# Patient Record
Sex: MALE | Race: WHITE | NOT HISPANIC OR LATINO | Employment: OTHER
[De-identification: names, ages, dates, MRNs, and addresses within clinical notes are randomized per-mention and may not be internally consistent; named-entity substitution may affect disease eponyms.]

---

## 2022-08-05 ENCOUNTER — TRANSCRIBE ORDERS (OUTPATIENT)
Dept: OTHER | Age: 72
End: 2022-08-05

## 2022-08-05 DIAGNOSIS — M25.561 ACUTE PAIN OF RIGHT KNEE: Primary | ICD-10-CM

## 2022-08-16 ENCOUNTER — THERAPY VISIT (OUTPATIENT)
Dept: PHYSICAL THERAPY | Facility: CLINIC | Age: 72
End: 2022-08-16
Attending: FAMILY MEDICINE
Payer: COMMERCIAL

## 2022-08-16 DIAGNOSIS — M25.561 ACUTE PAIN OF RIGHT KNEE: ICD-10-CM

## 2022-08-16 PROCEDURE — 97110 THERAPEUTIC EXERCISES: CPT | Mod: GP | Performed by: PHYSICAL THERAPIST

## 2022-08-16 PROCEDURE — 97161 PT EVAL LOW COMPLEX 20 MIN: CPT | Mod: GP | Performed by: PHYSICAL THERAPIST

## 2022-08-16 NOTE — PROGRESS NOTES
Physical Therapy Initial Evaluation  Subjective:    Patient Health History  Rosendo Joel being seen for R knee pain.     Problem began: 8/4/2022 (MD appt).   Problem occurred: Patient reports his knee pain began 2+ weeks ago after kneeling onto R knee   Pain is reported as 10/10 on pain scale.  General health as reported by patient is good.  Pertinent medical history includes: high blood pressure.   Red flags:  None as reported by patient.  Medical allergies: none.   Surgeries include:  Orthopedic surgery (s/p lumbar surgery, s/p neck fusion(total of 4 surgeries)).    Current medications:  High blood pressure medication and pain medication (for LBP, Tramadol).    Current occupation is Retired.   Primary job tasks include:  Driving.                  Therapist Generated HPI Evaluation         Type of problem:  Right knee.    This is a new condition.    Where condition occurred: at home.  Patient reports pain:  In the joint.  Pain is described as sharp and is intermittent.  Pain radiates to:  Thigh. Pain is the same all the time (activity dependent).  Since onset symptoms are gradually improving.  Associated symptoms:  Buckling/giving out, loss of motion/stiffness and loss of strength. Symptoms are exacerbated by bending/squatting, ascending stairs, descending stairs, transfers, kneeling, walking and standing (sitting with knee bent)  and relieved by heat and ice.  Special tests included:  X-ray (NL).    Work activity restrictions: N/A.  Barriers include:  None as reported by patient.                        Objective:  Standing Alignment:                Ankle/foot deviations: Reduced R gastroc muscle mass.  General Deviations:  Toe out R  Gait:    Gait Type:  Antalgic   Weight Bearing Status:  WBAT   Assistive Devices:  None  Deviations:  Lumbar:  Trunk lean RHip:  Trendelenberg RGeneral Deviations:  Toe out R, tamika decr and stance time decr    Flexibility/Screens:       Lower Extremity:      Decreased right lower  extremity flexibility:  Quadriceps; Hamstrings and Gastroc                                                 Hip Evaluation    Hip Strength:    Flexion:   Left: 5/5   -  Pain:  Right: 4/5   -  Pain:                      Abduction:  Left: 4/5    -   Pain:Right: 3-/5   +   Pain:                           Knee Evaluation:  ROM:  AROM: normal (B/symmetrical)            Strength:     Extension:  Left: 5/5    Pain:-      Right: 4+/5    Pain:+/-  Flexion:  Left: 5/5    Pain:-      Right: 4/5    Pain:-    Quad Set Left:  WNL    Pain: -   Quad Set Right:  Fair    Pain: +/-  Ligament Testing:  Normal                Special Tests:     Right knee positive for the following tests:  Meniscal  Palpation:      Right knee tenderness present at:  Patellar Tendon  Edema:  Normal    Mobility Testing:  Normal            Functional Testing:            Proprioception:   Stork Balance Test:  Left:  Unable  Right:  Unable  % of Uninvolved:           General     ROS    Assessment/Plan:    Patient is a 71 year old male with right knee complaints.    Patient has the following significant findings with corresponding treatment plan.                Diagnosis 1:  acure R knee pain  Pain -  self management, education and home program  Decreased strength - therapeutic exercise, therapeutic activities and home program  Decreased proprioception - neuro re-education, gait training, therapeutic activities and home program  Impaired gait - gait training, assistive devices and home program  Decreased function - therapeutic activities and home program    Therapy Evaluation Codes:   1) History comprised of:   Personal factors that impact the plan of care:      None.    Comorbidity factors that impact the plan of care are:      Weakness.     Medications impacting care: None.  2) Examination of Body Systems comprised of:   Body structures and functions that impact the plan of care:      Knee.   Activity limitations that impact the plan of care are:       Dressing, Lifting, Sitting, Stairs, Standing, Walking, Working and Sleeping.  3) Clinical presentation characteristics are:   Evolving/Changing.  4) Decision-Making    Low complexity using standardized patient assessment instrument and/or measureable assessment of functional outcome.  Cumulative Therapy Evaluation is: Low complexity.    Previous and current functional limitations:  (See Goal Flow Sheet for this information)    Short term and Long term goals: (See Goal Flow Sheet for this information)     Communication ability:  Patient appears to be able to clearly communicate and understand verbal and written communication and follow directions correctly.  Treatment Explanation - The following has been discussed with the patient:   RX ordered/plan of care  Anticipated outcomes  Possible risks and side effects  This patient would benefit from PT intervention to resume normal activities.   Rehab potential is good.    Frequency:  1 X week, once daily  Duration:  for 4 weeks tapering to 1 X a week, EOW over 4 weeks  Discharge Plan:  Achieve all LTG.  Independent in home treatment program.  Reach maximal therapeutic benefit.    Please refer to the daily flowsheet for treatment today, total treatment time and time spent performing 1:1 timed codes.

## 2022-08-17 PROBLEM — M25.561 ACUTE PAIN OF RIGHT KNEE: Status: ACTIVE | Noted: 2022-08-17

## 2022-08-17 ASSESSMENT — ACTIVITIES OF DAILY LIVING (ADL)
SQUAT: I AM UNABLE TO DO THE ACTIVITY
WALK: ACTIVITY IS VERY DIFFICULT
WEAKNESS: THE SYMPTOM AFFECTS MY ACTIVITY SEVERELY
GO UP STAIRS: ACTIVITY IS VERY DIFFICULT
AS_A_RESULT_OF_YOUR_KNEE_INJURY,_HOW_WOULD_YOU_RATE_YOUR_CURRENT_LEVEL_OF_DAILY_ACTIVITY?: SEVERELY ABNORMAL
STIFFNESS: THE SYMPTOM AFFECTS MY ACTIVITY SLIGHTLY
GO DOWN STAIRS: ACTIVITY IS VERY DIFFICULT
SWELLING: I DO NOT HAVE THE SYMPTOM
RAW_SCORE: 21
KNEEL ON THE FRONT OF YOUR KNEE: I AM UNABLE TO DO THE ACTIVITY
LIMPING: THE SYMPTOM AFFECTS MY ACTIVITY SEVERELY
HOW_WOULD_YOU_RATE_THE_CURRENT_FUNCTION_OF_YOUR_KNEE_DURING_YOUR_USUAL_DAILY_ACTIVITIES_ON_A_SCALE_FROM_0_TO_100_WITH_100_BEING_YOUR_LEVEL_OF_KNEE_FUNCTION_PRIOR_TO_YOUR_INJURY_AND_0_BEING_THE_INABILITY_TO_PERFORM_ANY_OF_YOUR_USUAL_DAILY_ACTIVITIES?: 50
SIT WITH YOUR KNEE BENT: ACTIVITY IS FAIRLY DIFFICULT
RISE FROM A CHAIR: ACTIVITY IS FAIRLY DIFFICULT
KNEE_ACTIVITY_OF_DAILY_LIVING_SCORE: 30
PAIN: THE SYMPTOM AFFECTS MY ACTIVITY SEVERELY
GIVING WAY, BUCKLING OR SHIFTING OF KNEE: THE SYMPTOM AFFECTS MY ACTIVITY MODERATELY
STAND: ACTIVITY IS VERY DIFFICULT
HOW_WOULD_YOU_RATE_THE_OVERALL_FUNCTION_OF_YOUR_KNEE_DURING_YOUR_USUAL_DAILY_ACTIVITIES?: SEVERELY ABNORMAL
KNEE_ACTIVITY_OF_DAILY_LIVING_SUM: 21

## 2022-08-17 NOTE — PROGRESS NOTES
[facility]    OUTPATIENT Physical Therapy ORTHOPEDIC EVALUATION  PLAN OF TREATMENT FOR OUTPATIENT REHABILITATION  (COMPLETE FOR INITIAL CLAIMS ONLY)  Patient's Last Name, First Name, M.I.  YOB: 1950  Rosendo Joel    Provider s Name:  [facility]   Medical Record No.  [id number]   Start of Care Date:  08/16/22   Onset Date:   08/04/22 (MD franks)   Type:     _X__PT   ___OT Medical Diagnosis:  No diagnosis found.     Treatment Diagnosis:  Acute R knee pain        Goals:     08/16/22 0500   Body Part   Goals listed below are for knee   Goal #1   Goal #1 standing   Previous Functional Level No restrictions   Current Functional Level Minutes patient can stand   Performance level <10 with PL 10/10   STG Target Performance Minutes patient will be able to stand   Performance level 10 with PL 5/10 or less   Rationale for personal hygiene;for meal preparation;for safe household ambulation;for safe community ambulation   Due date 09/06/22   LTG Target Performance Minutes patient will be able to stand   Performance Level 15 or more with PL 2-4/10   Rationale for safe household ambulation;for safe community ambulation;for meal preparation;for personal hygiene;for housekeeping tasks such as vacuuming, bed making, mowing   Due date 09/27/22       Therapy Frequency:  1x/week for 4 weeks then EOW for 4 weeks  Predicted Duration of Therapy Intervention:  8weeks    Shabana Wheeler, PT                 I CERTIFY THE NEED FOR THESE SERVICES FURNISHED UNDER        THIS PLAN OF TREATMENT AND WHILE UNDER MY CARE .             Physician Signature               Date    X_____________________________________________________                         Certification Date From:  08/16/22   Certification Date To:  10/11/22    Referring Provider:  Albert Nolasco  Assessment        See Epic Evaluation SOC Date: 08/16/22

## 2022-08-23 ENCOUNTER — THERAPY VISIT (OUTPATIENT)
Dept: PHYSICAL THERAPY | Facility: CLINIC | Age: 72
End: 2022-08-23
Payer: COMMERCIAL

## 2022-08-23 DIAGNOSIS — M25.561 ACUTE PAIN OF RIGHT KNEE: Primary | ICD-10-CM

## 2022-08-23 PROCEDURE — 97110 THERAPEUTIC EXERCISES: CPT | Mod: GP | Performed by: PHYSICAL THERAPIST

## 2022-08-23 PROCEDURE — 97140 MANUAL THERAPY 1/> REGIONS: CPT | Mod: GP | Performed by: PHYSICAL THERAPIST

## 2022-09-06 ENCOUNTER — THERAPY VISIT (OUTPATIENT)
Dept: PHYSICAL THERAPY | Facility: CLINIC | Age: 72
End: 2022-09-06
Payer: COMMERCIAL

## 2022-09-06 DIAGNOSIS — M25.561 ACUTE PAIN OF RIGHT KNEE: Primary | ICD-10-CM

## 2022-09-06 PROCEDURE — 97112 NEUROMUSCULAR REEDUCATION: CPT | Mod: GP | Performed by: PHYSICAL THERAPIST

## 2022-09-06 PROCEDURE — 97110 THERAPEUTIC EXERCISES: CPT | Mod: GP | Performed by: PHYSICAL THERAPIST

## 2022-09-06 PROCEDURE — 97140 MANUAL THERAPY 1/> REGIONS: CPT | Mod: GP | Performed by: PHYSICAL THERAPIST

## 2023-03-31 PROBLEM — M25.561 ACUTE PAIN OF RIGHT KNEE: Status: RESOLVED | Noted: 2022-08-17 | Resolved: 2023-03-31

## 2023-03-31 NOTE — PROGRESS NOTES
Discharge Note    Progress reporting period is from last SOAP note on Sep 6, 2022.    Rosendo failed to follow up and current status is unknown.  Please see information below for last relevant information on current status.  Patient seen for 3 visits.    SUBJECTIVE  Subjective changes noted by patient:  Patient returns to clinic reporting he is feelinfg much better,. Has been performing his HEP and it is helping. Most pain is focused about R sided LB, occasional R knee pain only. Able to stand longer and with slightly less pain. Patient will be holding off with further appts for several weeks and continue with HEP.  .  Current pain level is 6/10.     Previous pain level was  10/10.   Changes in function:  Yes (See Goal flowsheet attached for changes in current functional level)  Adverse reaction to treatment or activity: None    OBJECTIVE  Changes noted in objective findings: Ambulating into clinic without cane, improved gait. R LE continues to be ER'd. Progression of activity in clinic possible due to improved strength and reduced PL. SLS on L x 15 seconds, unable to SLS on R. Able to tandem stance with alt lead leg x30 sec each.     ASSESSMENT/PLAN  Diagnosis: Acute R knee pain   Updated problem list and treatment plan:   Pain - HEP  Decreased function - HEP  Decreased proprioception - HEP  STG/LTGs have been met or progress has been made towards goals:  Yes, please see goal flowsheet for most current information  Assessment of Progress: current status is unknown.    Last current status: Pt is progressing well   Self Management Plans:  HEP  I have re-evaluated this patient and find that the nature, scope, duration and intensity of the therapy is appropriate for the medical condition of the patient.  Rosendo continues to require the following intervention to meet STG and LTG's:  HEP.    Recommendations:  Discharge with current home program.  Patient to follow up with MD as needed.    Please refer to the daily flowsheet  for treatment today, total treatment time and time spent performing 1:1 timed codes.